# Patient Record
Sex: FEMALE | Race: BLACK OR AFRICAN AMERICAN | NOT HISPANIC OR LATINO | URBAN - METROPOLITAN AREA
[De-identification: names, ages, dates, MRNs, and addresses within clinical notes are randomized per-mention and may not be internally consistent; named-entity substitution may affect disease eponyms.]

---

## 2021-05-22 ENCOUNTER — OFFICE VISIT (OUTPATIENT)
Dept: URGENT CARE | Facility: CLINIC | Age: 26
End: 2021-05-22
Payer: MEDICAID

## 2021-05-22 VITALS
OXYGEN SATURATION: 98 % | SYSTOLIC BLOOD PRESSURE: 99 MMHG | DIASTOLIC BLOOD PRESSURE: 62 MMHG | BODY MASS INDEX: 31.02 KG/M2 | HEART RATE: 82 BPM | HEIGHT: 60 IN | RESPIRATION RATE: 17 BRPM | WEIGHT: 158 LBS | TEMPERATURE: 98 F

## 2021-05-22 DIAGNOSIS — K52.9 GASTROENTERITIS: ICD-10-CM

## 2021-05-22 DIAGNOSIS — R10.84 GENERALIZED ABDOMINAL PAIN: Primary | ICD-10-CM

## 2021-05-22 DIAGNOSIS — R19.7 DIARRHEA, UNSPECIFIED TYPE: ICD-10-CM

## 2021-05-22 LAB
B-HCG UR QL: NEGATIVE
BILIRUB UR QL STRIP: NEGATIVE
CTP QC/QA: YES
GLUCOSE UR QL STRIP: NEGATIVE
KETONES UR QL STRIP: NEGATIVE
LEUKOCYTE ESTERASE UR QL STRIP: NEGATIVE
PH, POC UA: 8 (ref 5–8)
POC BLOOD, URINE: NEGATIVE
POC NITRATES, URINE: NEGATIVE
PROT UR QL STRIP: NEGATIVE
SP GR UR STRIP: 1.01 (ref 1–1.03)
UROBILINOGEN UR STRIP-ACNC: NORMAL (ref 0.1–1.1)

## 2021-05-22 PROCEDURE — 81003 URINALYSIS AUTO W/O SCOPE: CPT | Mod: QW,S$GLB,, | Performed by: PHYSICIAN ASSISTANT

## 2021-05-22 PROCEDURE — 99213 PR OFFICE/OUTPT VISIT, EST, LEVL III, 20-29 MIN: ICD-10-PCS | Mod: 25,S$GLB,, | Performed by: PHYSICIAN ASSISTANT

## 2021-05-22 PROCEDURE — 99213 OFFICE O/P EST LOW 20 MIN: CPT | Mod: 25,S$GLB,, | Performed by: PHYSICIAN ASSISTANT

## 2021-05-22 PROCEDURE — 81025 POCT URINE PREGNANCY: ICD-10-PCS | Mod: S$GLB,,, | Performed by: PHYSICIAN ASSISTANT

## 2021-05-22 PROCEDURE — 81003 POCT URINALYSIS, DIPSTICK, AUTOMATED, W/O SCOPE: ICD-10-PCS | Mod: QW,S$GLB,, | Performed by: PHYSICIAN ASSISTANT

## 2021-05-22 PROCEDURE — 81025 URINE PREGNANCY TEST: CPT | Mod: S$GLB,,, | Performed by: PHYSICIAN ASSISTANT

## 2021-07-01 ENCOUNTER — PATIENT MESSAGE (OUTPATIENT)
Dept: ADMINISTRATIVE | Facility: OTHER | Age: 26
End: 2021-07-01

## 2022-10-17 ENCOUNTER — HOSPITAL ENCOUNTER (EMERGENCY)
Facility: HOSPITAL | Age: 27
Discharge: HOME | End: 2022-10-17
Attending: EMERGENCY MEDICINE
Payer: MEDICAID

## 2022-10-17 VITALS
HEIGHT: 60 IN | TEMPERATURE: 99 F | WEIGHT: 167 LBS | BODY MASS INDEX: 32.79 KG/M2 | RESPIRATION RATE: 18 BRPM | OXYGEN SATURATION: 98 % | DIASTOLIC BLOOD PRESSURE: 66 MMHG | SYSTOLIC BLOOD PRESSURE: 137 MMHG | HEART RATE: 77 BPM

## 2022-10-17 DIAGNOSIS — I88.9 LYMPHADENITIS: Primary | ICD-10-CM

## 2022-10-17 PROCEDURE — 99284 PR EMERGENCY DEPT VISIT,LEVEL IV: ICD-10-PCS | Mod: ,,, | Performed by: EMERGENCY MEDICINE

## 2022-10-17 PROCEDURE — 99284 EMERGENCY DEPT VISIT MOD MDM: CPT | Mod: ,,, | Performed by: EMERGENCY MEDICINE

## 2022-10-17 PROCEDURE — 99283 EMERGENCY DEPT VISIT LOW MDM: CPT

## 2022-10-17 RX ORDER — AMOXICILLIN 875 MG/1
875 TABLET, FILM COATED ORAL 2 TIMES DAILY
Qty: 14 TABLET | Refills: 0 | Status: SHIPPED | OUTPATIENT
Start: 2022-10-17 | End: 2022-12-16 | Stop reason: CLARIF

## 2022-10-17 NOTE — ED PROVIDER NOTES
Encounter Date: 10/17/2022       History     Chief Complaint   Patient presents with    Sore Throat     Pt complains of sore throat, headache, and ear pain x 1 week that progressively got worse last night. Denies fevers of chills.      26-year-old female presents with 1 week of a mild sore throat.  Yesterday she noticed some swelling to the right side of her neck.  She has some fullness in her ear which creates pressure.  Denies any other headaches.  No fever or neck stiffness.    Review of patient's allergies indicates:  No Known Allergies  Past Medical History:   Diagnosis Date    Asthma      History reviewed. No pertinent surgical history.  History reviewed. No pertinent family history.  Social History     Tobacco Use    Smoking status: Never    Smokeless tobacco: Never   Substance Use Topics    Alcohol use: Not Currently    Drug use: Not Currently     Review of Systems   Constitutional:  Negative for chills and fever.   HENT:  Positive for congestion and sore throat. Negative for sinus pressure.    Respiratory:  Negative for cough.      Physical Exam     Initial Vitals [10/17/22 0825]   BP Pulse Resp Temp SpO2   137/66 77 18 98.7 °F (37.1 °C) 98 %      MAP       --         Physical Exam    HENT:   Head: Normocephalic and atraumatic.   Right Ear: External ear normal.   Left Ear: External ear normal.   Mouth/Throat: Oropharynx is clear and moist. No oropharyngeal exudate.   Eyes: Pupils are equal, round, and reactive to light.   Neck: Neck supple. No JVD present.   Normal range of motion.  Cardiovascular:  Normal rate, regular rhythm and normal heart sounds.           Pulmonary/Chest: Breath sounds normal. No stridor. No respiratory distress. She has no wheezes. She has no rales.   Musculoskeletal:      Cervical back: Normal range of motion and neck supple.     Lymphadenopathy:     She has cervical adenopathy (To mildly enlarged lymph nodes to the right anterior cervical chain).   Neurological: She is alert. She  has normal strength. No cranial nerve deficit or sensory deficit.   Psychiatric: She has a normal mood and affect.       ED Course   Procedures  Labs Reviewed   HIV 1 / 2 ANTIBODY   HEPATITIS C ANTIBODY          Imaging Results    None          Medications - No data to display  Medical Decision Making:   Initial Assessment:   25-year-old with mildly enlarged lymph nodes on the right.  She does not appear to be clinically ill.  I suspect this is reactive lymph nodes to a viral process.  She is concerned about infection.  Will prescribe 7 day course of amoxicillin.                        Clinical Impression:   Final diagnoses:  [I88.9] Lymphadenitis (Primary)      ED Disposition Condition    Discharge Stable          ED Prescriptions       Medication Sig Dispense Start Date End Date Auth. Provider    amoxicillin (AMOXIL) 875 MG tablet Take 1 tablet (875 mg total) by mouth 2 (two) times daily. 14 tablet 10/17/2022 -- Eduardo Morales MD          Follow-up Information       Follow up With Specialties Details Why Contact Info    Your Primary Care Physician  Schedule an appointment as soon as possible for a visit in 2 days      [facility]  Schedule an appointment as soon as possible for a visit in 1 week  [address] [facility] [id number]    [facility] - Emergency Dept Emergency Medicine  If symptoms worsen [address] [facility] [address] [phone]             Eduardo Morales MD  10/17/22 0889

## 2022-10-17 NOTE — ED NOTES
Patient states right neck pain x 1 week.pain with swallowing to ear, states he wakes in the middle of the night with headache. Deneis fevers

## 2022-10-17 NOTE — Clinical Note
"Jami Marquez" Christine was seen and treated in our emergency department on 10/17/2022.  She may return to work on 10/18/2022.       If you have any questions or concerns, please don't hesitate to call.      Miryam Harris RN    "

## 2022-10-17 NOTE — ED NOTES
Patient identifiers verified and correct for  Ms King  C/C: Neck paiN SEE NN  APPEARANCE: awake and alert in NAD.  SKIN: warm, dry and intact. No breakdown or bruising.  MUSCULOSKELETAL: Patient moving all extremities spontaneously, no obvious swelling or deformities noted. Ambulates independently.  RESPIRATORY: Denies shortness of breath.Respirations unlabored.   CARDIAC: Denies CP, 2+ distal pulses; no peripheral edema  ABDOMEN: S/ND/NT, Denies nausea  : voids spontaneously, denies difficulty  Neurologic: AAO x 4; follows commands equal strength in all extremities; denies numbness/tingling. Denies dizziness  Denies weakness, reports left neck pain , worse to ear with yawning

## 2022-12-16 ENCOUNTER — HOSPITAL ENCOUNTER (EMERGENCY)
Facility: HOSPITAL | Age: 27
Discharge: HOME | End: 2022-12-16
Attending: EMERGENCY MEDICINE
Payer: MEDICAID

## 2022-12-16 VITALS
OXYGEN SATURATION: 95 % | TEMPERATURE: 99 F | HEART RATE: 78 BPM | BODY MASS INDEX: 32.22 KG/M2 | RESPIRATION RATE: 17 BRPM | SYSTOLIC BLOOD PRESSURE: 110 MMHG | DIASTOLIC BLOOD PRESSURE: 57 MMHG | WEIGHT: 165 LBS

## 2022-12-16 DIAGNOSIS — R10.2 PELVIC PAIN IN FEMALE: Primary | ICD-10-CM

## 2022-12-16 LAB
B-HCG UR QL: NEGATIVE
BACTERIA GENITAL QL WET PREP: ABNORMAL
BILIRUB UR QL STRIP: NEGATIVE
C TRACH DNA SPEC QL NAA+PROBE: NOT DETECTED
CLARITY UR REFRACT.AUTO: CLEAR
CLUE CELLS VAG QL WET PREP: ABNORMAL
COLOR UR AUTO: NORMAL
CTP QC/QA: YES
FILAMENT FUNGI VAG WET PREP-#/AREA: ABNORMAL
GLUCOSE UR QL STRIP: NEGATIVE
HGB UR QL STRIP: NEGATIVE
KETONES UR QL STRIP: NEGATIVE
LEUKOCYTE ESTERASE UR QL STRIP: NEGATIVE
N GONORRHOEA DNA SPEC QL NAA+PROBE: NOT DETECTED
NITRITE UR QL STRIP: NEGATIVE
PH UR STRIP: 5 [PH] (ref 5–8)
PROT UR QL STRIP: NEGATIVE
SP GR UR STRIP: 1.01 (ref 1–1.03)
SPECIMEN SOURCE: ABNORMAL
T VAGINALIS GENITAL QL WET PREP: ABNORMAL
URN SPEC COLLECT METH UR: NORMAL
WBC #/AREA VAG WET PREP: ABNORMAL
YEAST GENITAL QL WET PREP: ABNORMAL

## 2022-12-16 PROCEDURE — 99283 EMERGENCY DEPT VISIT LOW MDM: CPT | Mod: 25

## 2022-12-16 PROCEDURE — 99284 EMERGENCY DEPT VISIT MOD MDM: CPT | Mod: ,,, | Performed by: PHYSICIAN ASSISTANT

## 2022-12-16 PROCEDURE — 87210 SMEAR WET MOUNT SALINE/INK: CPT | Performed by: PHYSICIAN ASSISTANT

## 2022-12-16 PROCEDURE — 87591 N.GONORRHOEAE DNA AMP PROB: CPT | Performed by: PHYSICIAN ASSISTANT

## 2022-12-16 PROCEDURE — 87491 CHLMYD TRACH DNA AMP PROBE: CPT | Performed by: PHYSICIAN ASSISTANT

## 2022-12-16 PROCEDURE — 99284 PR EMERGENCY DEPT VISIT,LEVEL IV: ICD-10-PCS | Mod: ,,, | Performed by: PHYSICIAN ASSISTANT

## 2022-12-16 PROCEDURE — 81025 URINE PREGNANCY TEST: CPT | Performed by: PHYSICIAN ASSISTANT

## 2022-12-16 PROCEDURE — 81003 URINALYSIS AUTO W/O SCOPE: CPT | Performed by: PHYSICIAN ASSISTANT

## 2022-12-16 RX ORDER — IBUPROFEN 600 MG/1
600 TABLET ORAL
Status: DISCONTINUED | OUTPATIENT
Start: 2022-12-16 | End: 2022-12-16 | Stop reason: HOSPADM

## 2022-12-16 RX ORDER — IBUPROFEN 600 MG/1
600 TABLET ORAL EVERY 6 HOURS PRN
Qty: 20 TABLET | Refills: 0 | Status: SHIPPED | OUTPATIENT
Start: 2022-12-16

## 2022-12-16 NOTE — ED NOTES
LOC: The patient is awake, alert, and oriented to self, place, time, and situation. Pt is calm and cooperative. Affect is appropriate.  Speech is appropriate and clear.     APPEARANCE: Patient resting uncomfortably, reporting pelvic pressure that started yesterday,  in no acute distress.  Patient is clean and well groomed.    SKIN: The skin is warm and dry; color consistent with ethnicity.  Patient has normal skin turgor and moist mucus membranes.  Skin intact; no breakdown or bruising noted.     MUSCULOSKELETAL: Patient moving upper and lower extremities without difficulty; denies pain in the extremities or back.  Denies weakness.     RESPIRATORY: Airway is open and patent. Respirations spontaneous, even, easy, and non-labored.  Patient has a normal effort and rate.  No accessory muscle use noted. Denies cough.     CARDIAC:  No peripheral edema noted. No complaints of chest pain.      ABDOMEN: Soft and non tender to palpation.  No distention noted. Pt denies abdominal pain; denies nausea, vomiting, diarrhea, or constipation.    NEUROLOGIC: Eyes open spontaneously.  Behavior appropriate to situation.  Follows commands; facial expression symmetrical.  Purposeful motor response noted; normal sensation in all extremities. Pt denies headache; denies lightheadedness or dizziness; denies visual disturbances; denies loss of balance; denies unilateral weakness.

## 2022-12-16 NOTE — ED PROVIDER NOTES
Encounter Date: 12/16/2022       History     Chief Complaint   Patient presents with    Urinary Tract Infection     Possible UTI, pt reports left side pelvic pain and pressure       27-year-old female with asthma presents for suprapubic pressure which started yesterday.  Her symptoms are intermittent, she started to have some discomfort after holding her bladder for any extended period of time.  However, no significant relief with urination.  Did not take any medication prior to arrival.  She reports associated bladder discomfort.  She denies dysuria, hematuria, vaginal discharge or bleeding, nausea/vomiting, flank pain, fevers/chills or prior similar episodes.  She is concerned she might have a UTI.  She denies any concern for sexually transmitted infections.    Review of patient's allergies indicates:  No Known Allergies  Past Medical History:   Diagnosis Date    Asthma      History reviewed. No pertinent surgical history.  History reviewed. No pertinent family history.  Social History     Tobacco Use    Smoking status: Never    Smokeless tobacco: Never   Substance Use Topics    Alcohol use: Not Currently    Drug use: Not Currently     Review of Systems   Constitutional:  Negative for fever.   HENT:  Negative for sore throat.    Respiratory:  Negative for shortness of breath.    Cardiovascular:  Negative for chest pain.   Gastrointestinal:  Negative for nausea and vomiting.   Genitourinary:  Positive for frequency. Negative for dysuria, flank pain, hematuria, pelvic pain, vaginal bleeding and vaginal discharge.   Musculoskeletal:  Negative for back pain.   Skin:  Negative for rash.   Neurological:  Negative for weakness.   Hematological:  Does not bruise/bleed easily.     Physical Exam     Initial Vitals [12/16/22 0835]   BP Pulse Resp Temp SpO2   (!) 108/57 61 18 98.2 °F (36.8 °C) 99 %      MAP       --         Physical Exam    Nursing note and vitals reviewed.  Constitutional: She appears well-developed and  well-nourished. She is not diaphoretic. No distress.   HENT:   Head: Normocephalic and atraumatic.   Eyes: EOM are normal. Pupils are equal, round, and reactive to light.   Neck:   Normal range of motion.  Cardiovascular:  Normal rate, regular rhythm, normal heart sounds and intact distal pulses.     Exam reveals no gallop and no friction rub.       No murmur heard.  Pulmonary/Chest: Breath sounds normal. No respiratory distress. She has no wheezes. She has no rhonchi. She has no rales. She exhibits no tenderness.   Abdominal: Abdomen is soft. Bowel sounds are normal. She exhibits no distension and no mass. There is no abdominal tenderness. There is no rebound and no guarding.   Genitourinary:    Genitourinary Comments: ED tech present as chaperone for pelvic exam.  Scant whitish discharge in the vaginal vault.  No CMT, uterine or adnexal tenderness     Musculoskeletal:         General: Normal range of motion.      Cervical back: Normal range of motion.     Neurological: She is alert and oriented to person, place, and time.   Skin: Skin is warm and dry.   Psychiatric: She has a normal mood and affect.       ED Course   Procedures  Labs Reviewed   VAGINAL SCREEN - Abnormal; Notable for the following components:       Result Value    Clue Cells Few (*)     Bacteria - Vaginal Screen Moderate (*)     All other components within normal limits    Narrative:     Release to patient->Immediate   C. TRACHOMATIS/N. GONORRHOEAE BY AMP DNA   URINALYSIS, REFLEX TO URINE CULTURE    Narrative:     Specimen Source->Urine   POCT URINE PREGNANCY          Imaging Results    None          Medications   ibuprofen tablet 600 mg (600 mg Oral Not Given 12/16/22 0900)     Medical Decision Making:   History:   Old Medical Records: I decided to obtain old medical records.  Old Records Summarized: records from clinic visits and records from previous admission(s).       <> Summary of Records: Most recent ED visit in October for  lymphadenitis  Initial Assessment:   27-year-old female presenting for bladder discomfort.  /57, vitals otherwise normal.  Well-appearing  Differential Diagnosis:   UTI   Pregnancy   Vulvovaginal candidiasis   She has no fever, CMT or uterine tenderness to suggest PID  Clinical Tests:   Lab Tests: Ordered and Reviewed  ED Management:  Will check labs, give analgesics reassess.    Patient declined pain medications.  Workup is unrevealing.  Will discharge with instructions to follow up with PCP/gyn and return to the ED for worsening symptoms. Stressed the importance of follow-up, strict ED return precautions given.  Patient voiced understanding and is comfortable with discharge.            ED Course as of 12/16/22 1133   Fri Dec 16, 2022   0946 Leukocytes, UA: Negative  Not not consistent with UTI [CC]   1024 Trichomonas: None [CC]   1024 Budding Yeast: None [CC]   1115 Patient declined any pain medication.  Will discharge with instructions to follow up with PCP/Ob gyn. [CC]      ED Course User Index  [CC] Jesi Shelton PA-C                 Clinical Impression:   Final diagnoses:  [R10.2] Pelvic pain in female (Primary)        ED Disposition Condition    Discharge Stable          ED Prescriptions       Medication Sig Dispense Start Date End Date Auth. Provider    ibuprofen (ADVIL,MOTRIN) 600 MG tablet Take 1 tablet (600 mg total) by mouth every 6 (six) hours as needed for Pain. 20 tablet 12/16/2022 -- Jesi Shelton PA-C          Follow-up Information       Follow up With Specialties Details Why Contact Info    [facility] Gynecology Schedule an appointment as soon as possible for a visit in 1 week  [address]  [facility] [address]  [phone]    [facility] - Emergency Dept Emergency Medicine Go to   [address]  [facility] [address] [phone]             Jesi Shelton PA-C  12/16/22 1131

## 2022-12-16 NOTE — DISCHARGE INSTRUCTIONS
Diagnosis: Pelvic pain    I am not sure what is causing your bladder pressure.  Your urine does not show a urinary tract infection.  Recent testing for gonorrhea and chlamydia, we will call you if these are positive.  Tests for yeast infection, BV and trichomoniasis were negative.  You were not tested for other sexually transmitted infections such as HIV or syphilis.    Please schedule an appointment with your primary care doctor or Ob/gyn for follow-up. If you start to have any new or worsening symptoms, please come back to the emergency department.

## 2023-06-20 ENCOUNTER — PATIENT MESSAGE (OUTPATIENT)
Dept: RESEARCH | Facility: HOSPITAL | Age: 28
End: 2023-06-20
Payer: MEDICAID

## 2023-06-27 ENCOUNTER — PATIENT MESSAGE (OUTPATIENT)
Dept: RESEARCH | Facility: HOSPITAL | Age: 28
End: 2023-06-27
Payer: MEDICAID